# Patient Record
Sex: MALE | Race: WHITE | Employment: FULL TIME | URBAN - METROPOLITAN AREA
[De-identification: names, ages, dates, MRNs, and addresses within clinical notes are randomized per-mention and may not be internally consistent; named-entity substitution may affect disease eponyms.]

---

## 2020-06-09 ENCOUNTER — OFFICE VISIT (OUTPATIENT)
Dept: NEUROLOGY | Age: 45
End: 2020-06-09
Payer: COMMERCIAL

## 2020-06-09 VITALS
DIASTOLIC BLOOD PRESSURE: 96 MMHG | TEMPERATURE: 97.3 F | WEIGHT: 192 LBS | HEART RATE: 73 BPM | SYSTOLIC BLOOD PRESSURE: 143 MMHG | BODY MASS INDEX: 27.49 KG/M2 | HEIGHT: 70 IN

## 2020-06-09 PROCEDURE — G8427 DOCREV CUR MEDS BY ELIG CLIN: HCPCS | Performed by: PSYCHIATRY & NEUROLOGY

## 2020-06-09 PROCEDURE — 1036F TOBACCO NON-USER: CPT | Performed by: PSYCHIATRY & NEUROLOGY

## 2020-06-09 PROCEDURE — 99204 OFFICE O/P NEW MOD 45 MIN: CPT | Performed by: PSYCHIATRY & NEUROLOGY

## 2020-06-09 PROCEDURE — G8419 CALC BMI OUT NRM PARAM NOF/U: HCPCS | Performed by: PSYCHIATRY & NEUROLOGY

## 2020-06-09 RX ORDER — PREDNISONE 20 MG/1
TABLET ORAL
Qty: 18 TABLET | Refills: 0 | Status: SHIPPED | OUTPATIENT
Start: 2020-06-09 | End: 2020-08-13

## 2020-06-09 RX ORDER — AMLODIPINE BESYLATE 5 MG/1
5 TABLET ORAL DAILY
COMMUNITY

## 2020-06-09 NOTE — PROGRESS NOTES
REVIEW OF SYSTEMS    Constitutional Weight: absent, Appetite: absent, Fatigue: absent   HEENT Visual disturbance (saw a stream of light from his side vision): present, Ears: ringing   Respiratory Shortness of breath: absent, Cough: absent   Cardiovascular Chest pain: absent, Leg swelling :absent   GI Constipation: absent, Diarrhea: absent, Swallowing change: absent    Urinary frequency: absent, Urinary urgency: absent,    Musculoskeletal Neck pain: absent, Back pain: absent, Stiffness: absent, Muscle pain: absent, Joint pain: absent   Dermatological Hair loss: absent, Skin changes: absent   Neurological Memory loss: absent, Confusion: absent, Seizures: absent, Trouble walking or imbalance: absent, Dizziness: absent, Weakness: absent, Numbness: absent Tremor: absent, Spasm: absent, Speech difficulty: absent, Headache: present, Light sensitivity: absent   Psychiatric Anxiety: present, Hallucination: absent, Depression, present   Hematologic Abnormal bleeding/Bruising: absent, Anemia: absent
Jesenia Caban MD I would like to thank you for the consult. Please do not hesitate if you have any questions about the patient care. Scribe Attestation:   By signing my name below, I, Fito Sharif, attest that this documentation has been prepared under the direction and in the presence of Kwasi Herzog MD.     Electronically Signed: Fito Sharif. 6/9/20. 9:32 AM EDT. Physician Attestation:   Nolan Essex MD, personally performed the services described in this documentation. All medical record entries made by the scribe were at my direction and in my presence. I have reviewed the chart and discharge instructions (if applicable) and agree that the record reflects my personal performance and is accurate and complete.     Electronically Signed: Dangelo Hansen 6/10/2020 11:31 AM    Diplomate, American Board of Psychiatry and Neurology  Diplomate, American Board of Clinical Neurophysiology  Diplomate, American Board of Epilepsy

## 2020-06-11 ENCOUNTER — HOSPITAL ENCOUNTER (OUTPATIENT)
Age: 45
Setting detail: SPECIMEN
Discharge: HOME | End: 2020-06-11
Payer: COMMERCIAL

## 2020-06-11 LAB
ALT SERPL-CCNC: 11 U/L (ref 5–41)
ANION GAP SERPL CALCULATED.3IONS-SCNC: 17 MMOL/L (ref 9–17)
AST SERPL-CCNC: 13 U/L
BUN BLDV-MCNC: 14 MG/DL (ref 6–20)
BUN/CREAT BLD: NORMAL (ref 9–20)
CALCIUM SERPL-MCNC: 9.9 MG/DL (ref 8.6–10.4)
CHLORIDE BLD-SCNC: 102 MMOL/L (ref 98–107)
CHOLESTEROL/HDL RATIO: 3.7
CHOLESTEROL: 197 MG/DL
CO2: 23 MMOL/L (ref 20–31)
CREAT SERPL-MCNC: 0.99 MG/DL (ref 0.7–1.2)
GFR AFRICAN AMERICAN: >60 ML/MIN
GFR NON-AFRICAN AMERICAN: >60 ML/MIN
GFR SERPL CREATININE-BSD FRML MDRD: NORMAL ML/MIN/{1.73_M2}
GFR SERPL CREATININE-BSD FRML MDRD: NORMAL ML/MIN/{1.73_M2}
GLUCOSE BLD-MCNC: 81 MG/DL (ref 70–99)
HCT VFR BLD CALC: 48.8 % (ref 40.7–50.3)
HDLC SERPL-MCNC: 53 MG/DL
HEMOGLOBIN: 15.4 G/DL (ref 13–17)
LDL CHOLESTEROL: 123 MG/DL (ref 0–130)
MCH RBC QN AUTO: 29.1 PG (ref 25.2–33.5)
MCHC RBC AUTO-ENTMCNC: 31.6 G/DL (ref 28.4–34.8)
MCV RBC AUTO: 92.2 FL (ref 82.6–102.9)
NRBC AUTOMATED: 0 PER 100 WBC
PDW BLD-RTO: 13.3 % (ref 11.8–14.4)
PLATELET # BLD: 357 K/UL (ref 138–453)
PMV BLD AUTO: 10.7 FL (ref 8.1–13.5)
POTASSIUM SERPL-SCNC: 4.3 MMOL/L (ref 3.7–5.3)
RBC # BLD: 5.29 M/UL (ref 4.21–5.77)
SODIUM BLD-SCNC: 142 MMOL/L (ref 135–144)
TRIGL SERPL-MCNC: 104 MG/DL
VLDLC SERPL CALC-MCNC: NORMAL MG/DL (ref 1–30)
WBC # BLD: 11.1 K/UL (ref 3.5–11.3)

## 2020-06-19 ENCOUNTER — TELEPHONE (OUTPATIENT)
Dept: NEUROLOGY | Age: 45
End: 2020-06-19

## 2020-06-19 NOTE — TELEPHONE ENCOUNTER
Patient called the office this afternoon to give an update. He stated that he took his last prednisone dose yesterday morning. Patient doesn't feel that it helped his headaches. I did tell him that it can take a few days after finishing the prednisone. I told him that both Dr. Ivette Correa and his nurse Stanley Sweeney were out of the office until Monday. Patient will call back Monday to update how he is doing at that time.

## 2020-06-25 ENCOUNTER — HOSPITAL ENCOUNTER (OUTPATIENT)
Dept: MRI IMAGING | Facility: CLINIC | Age: 45
Discharge: HOME | End: 2020-06-27
Payer: COMMERCIAL

## 2020-06-25 PROCEDURE — A9579 GAD-BASE MR CONTRAST NOS,1ML: HCPCS | Performed by: PSYCHIATRY & NEUROLOGY

## 2020-06-25 PROCEDURE — 70553 MRI BRAIN STEM W/O & W/DYE: CPT

## 2020-06-25 PROCEDURE — 6360000004 HC RX CONTRAST MEDICATION: Performed by: PSYCHIATRY & NEUROLOGY

## 2020-06-25 RX ADMIN — GADOTERIDOL 17 ML: 279.3 INJECTION, SOLUTION INTRAVENOUS at 08:31

## 2020-07-02 NOTE — TELEPHONE ENCOUNTER
I called pt to see how his headaches were doing. He said that he is still getting them intermittently throughout the day, but not every day. He said that he woke up today with one 4/10. He said that it lasted about 30 mins and then went away. I told him that I will let you know about this and if you decided to send in any medication for him I would call and let him know. He stated his understanding.

## 2020-07-07 NOTE — TELEPHONE ENCOUNTER
I would suggest to start amitriptyline 25 mg p.o. nightly. Please caution the patient about the potential side effects including drowsiness. Please have him call back in about 2 weeks, sooner if any complaints.  Thanks

## 2020-07-13 RX ORDER — AMITRIPTYLINE HYDROCHLORIDE 25 MG/1
25 TABLET, FILM COATED ORAL NIGHTLY
Qty: 30 TABLET | Refills: 0 | Status: SHIPPED | OUTPATIENT
Start: 2020-07-13 | End: 2020-08-21

## 2020-08-13 ENCOUNTER — OFFICE VISIT (OUTPATIENT)
Dept: NEUROLOGY | Age: 45
End: 2020-08-13
Payer: COMMERCIAL

## 2020-08-13 VITALS
DIASTOLIC BLOOD PRESSURE: 86 MMHG | HEART RATE: 81 BPM | HEIGHT: 70 IN | BODY MASS INDEX: 27.92 KG/M2 | SYSTOLIC BLOOD PRESSURE: 137 MMHG | WEIGHT: 195 LBS

## 2020-08-13 PROCEDURE — G8419 CALC BMI OUT NRM PARAM NOF/U: HCPCS | Performed by: PSYCHIATRY & NEUROLOGY

## 2020-08-13 PROCEDURE — 1036F TOBACCO NON-USER: CPT | Performed by: PSYCHIATRY & NEUROLOGY

## 2020-08-13 PROCEDURE — 99214 OFFICE O/P EST MOD 30 MIN: CPT | Performed by: PSYCHIATRY & NEUROLOGY

## 2020-08-13 PROCEDURE — G8427 DOCREV CUR MEDS BY ELIG CLIN: HCPCS | Performed by: PSYCHIATRY & NEUROLOGY

## 2020-08-13 NOTE — PROGRESS NOTES
[facility], [address], [address] [place] [address]  Ph: [phone] or [phone]  FAX: [phone]    Chief Complaint: headaches     Dear No primary care provider on file. I had the pleasure of seeing your patient J Carlos Tariq in neurologic evaluation for headaches. As you would recall J Carlos Tariq is a 40 y.o. male. The history has been obtained from the patient and from the accompanying medical records. He reports that he has been having headaches since May 5th, 2020 but there is exacerbation of the symptoms over the past few weeks. The headaches are mild left frontal headache described as feeling similar to a caffeine withdrawal headache. The headaches are throbbing in nature. Patient states he was not controlling his hypertension at the time they began. He went to his primary care physician and received new medication to regulate his blood pressure. The left frontal headache has continued to wax and wane daily with about 5/10 on a pain scale. Pain is 5/10 about 3 days a week. In the last 30 days, patient reports no headache free days. Headaches are somewhat alleviated by physical activity and naproxen as needed. Prednisone taper did not provide relief. Amitriptyline reduces frequency and intensity. He has been on this for 4 weeks. Some fatigue, but otherwise no side effects. He is 60% better with amitriptyline. Denies difficulty with memory. No history of headaches. 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 Patient occasionally drinks alcohol. MRI brain 6/25/20 Unremarkable pre and post-contrast MRI of the brain.      REVIEW OF SYSTEMS   Constitutional Weight: absent, Appetite: absent, Fatigue: absent   HEENT Visual disturbance: absentEars: tinnitus left ear,    Reespiratory Shortness of breath: absent, Cough: absent   Cardivascular Chest pain: absent, Leg swelling :absent   GI Constipation: absent, Diarrhea: absent, Swallowing change: absent    Urinary frequency: absent, Urinary urgency: absent,    Musculoskeletal Neck pain: absent, Back pain: absent, Stiffness: absent, Muscle pain: absent, Joint pain: absent R   Dermatological Hair loss: absent, Skin changes: absent   Neurological Memory loss: absent, Confusion: absent, Seizures: absent, Trouble walking or imbalance: absent, Dizziness: absent, Weakness: absent, Numbness: absent Tremor: present, Spasm: absent, Speech difficulty: absent, Headache: present, Light sensitivity: absent   Psychiatric Anxiety: absent, Hallucination: absent, Mood disorder: Depression, absent   Hematologic Abnormal bleeding: absent, Anemia: absent,          Neurological work up:  CT head  CTA head and neck    MRI brain 20 Unremarkable pre and post-contrast MRI of the brain.   2 D echo     Past Medical History:   Diagnosis Date    GERD (gastroesophageal reflux disease)     Hypertension      Past Surgical History:   Procedure Laterality Date    UPPER GASTROINTESTINAL ENDOSCOPY  Aug 2010    Dr Brisa Herrera  (GERD)    WISDOM TOOTH EXTRACTION       No Known Allergies  Family History   Problem Relation Age of Onset    High Blood Pressure Father     High Cholesterol Father     Cancer Maternal Grandmother     Alzheimer's Disease Maternal Grandmother     Cancer Maternal Grandfather       Social History     Socioeconomic History    Marital status: Single     Spouse name: Not on file    Number of children: Not on file    Years of education: Not on file    Highest education level: Not on file   Occupational History    Not on file   Social Needs    Financial resource strain: Not on file    Food insecurity     Worry: Not on file     Inability: Not on file    Transportation needs     Medical: Not on file     Non-medical: Not on file   Tobacco Use    Smoking status: Former Smoker     Packs/day: 1.00     Years: 15.00     Pack years: 15.00     Last attempt to quit: 2008     Years since quittin.5    Smokeless tobacco: Former User Types: Lyndsay Stern     Quit date: 1/14/2008   Substance and Sexual Activity    Alcohol use: Yes     Comment: rarely     Drug use: Yes     Types: Marijuana     Comment: occasional    Sexual activity: Yes     Partners: Female     Comment: spouse   Lifestyle    Physical activity     Days per week: Not on file     Minutes per session: Not on file    Stress: Not on file   Relationships    Social connections     Talks on phone: Not on file     Gets together: Not on file     Attends Baptism service: Not on file     Active member of club or organization: Not on file     Attends meetings of clubs or organizations: Not on file     Relationship status: Not on file    Intimate partner violence     Fear of current or ex partner: Not on file     Emotionally abused: Not on file     Physically abused: Not on file     Forced sexual activity: Not on file   Other Topics Concern    Not on file   Social History Narrative    Not on file      /86   Pulse 81   Ht 5' 10\" (1.778 m)   Wt 195 lb (88.5 kg)   BMI 27.98 kg/m²      Physical examination:  General appearance: Normal. Well groomed.  In no acute distress    Head: Normocephalic, atraumatic  Eyes: Extraocular movements intact, eye lids normal  Lungs: Respirations unlabored, chest wall no deformity  ENT: Normal external ear canals, no sinus tenderness  Heart: Regular rate rhythm  Abdomen: No masses, tenderness  Extremities: No cyanosis or edema, 2+ pulses  Musculoskeletal: Normal range of motion in all joints  Skin: Intact, normal skin color    Neurological examination:    Mental status   Alert and oriented; intact memory with no confusion, speech or language problems; no hallucinations or delusions     Cranial nerves   II - visual fields intact to confrontation                                                III, IV, VI - extra-ocular muscles full: no pupillary defect; no ZHENG, no nystagmus, no ptosis   V - normal facial sensation VII - normal facial symmetry                                                             VIII - intact hearing                                                                             IX, X - symmetrical palate                                                                  XI - symmetrical shoulder shrug                                                       XII - midline tongue without atrophy or fasciculation     Motor function  Normal muscle bulk and tone; normal power 5/5, including fine motor movements. Sensory function Intact to touch, pin, vibration, proprioception     Cerebellar Intact fine motor movement. No involuntary movements or tremors     Reflex function Intact 2+ DTR and symmetric. Negative Babinski     Gait                  Normal station and gait       Lab Results   Component Value Date    LDLCHOLESTEROL 123 06/11/2020     No components found for: CHLPL  Lab Results   Component Value Date    TRIG 104 06/11/2020    TRIG 193 (H) 12/28/2011     Lab Results   Component Value Date    HDL 53 06/11/2020    HDL 49 12/28/2011     No results found for: LDLCALC  No results found for: LABVLDL  No results found for: LABA1C  No results found for: EAG  No results found for:  Alice     All of patient's labs were personally reviewed. All the imaging studies were personally reviewed and discussed with the patient. Assessment Recommendations:  Medically intractable headache    The patient has seen 60% improvement with amitriptyline. Recommend to continue amitriptyline 25 mg nightly. I would like the patient to keep a headache log to record the days he has a headache, as well as its severity and associated symptoms. May increase as the patient tolerates. MRI brain was unremarkable. Medication side effects were discussed and questions were answered. He will follow-up in 2-3 months or sooner if the patient's symptoms worsen or if there are any side effects.     No primary care provider on file. I would like to thank you for the consult. Please do not hesitate if you have any questions about the patient care. Scribe Attestation:   By signing my name below, I, Macho Zelaya, attest that this documentation has been prepared under the direction and in the presence of Raffy Chaidez MD.     Electronically Signed: Macho Zelaya. 8/13/20. 9:35 AM EDT. Physician Attestation:   Orly Tomlinson MD, personally performed the services described in this documentation. All medical record entries made by the scribe were at my direction and in my presence. I have reviewed the chart and discharge instructions (if applicable) and agree that the record reflects my personal performance and is accurate and complete.     Electronically Signed: Kelly Kinney 8/19/2020 11:53 PM    Diplomate, American Board of Psychiatry and Neurology  Diplomate, American Board of Clinical Neurophysiology  Diplomate, American Board of Epilepsy

## 2020-08-13 NOTE — PROGRESS NOTES
REVIEW OF SYSTEMS    Constitutional Weight: absent, Appetite: absent, Fatigue: absent   HEENT Visual disturbance: absentEars: tinnitus left ear,    Reespiratory Shortness of breath: absent, Cough: absent   Cardivascular Chest pain: absent, Leg swelling :absent   GI Constipation: absent, Diarrhea: absent, Swallowing change: absent    Urinary frequency: absent, Urinary urgency: absent,    Musculoskeletal Neck pain: absent, Back pain: absent, Stiffness: absent, Muscle pain: absent, Joint pain: absent R   Dermatological Hair loss: absent, Skin changes: absent   Neurological Memory loss: absent, Confusion: absent, Seizures: absent, Trouble walking or imbalance: absent, Dizziness: absent, Weakness: absent, Numbness: absent Tremor: present, Spasm: absent, Speech difficulty: absent, Headache: present, Light sensitivity: absent   Psychiatric Anxiety: absent, Hallucination: absent, Mood disorder: Depression, absent   Hematologic Abnormal bleeding: absent, Anemia: absent,

## 2020-08-21 RX ORDER — AMITRIPTYLINE HYDROCHLORIDE 25 MG/1
25 TABLET, FILM COATED ORAL NIGHTLY
Qty: 30 TABLET | Refills: 5 | Status: SHIPPED | OUTPATIENT
Start: 2020-08-21 | End: 2020-10-14

## 2020-08-21 NOTE — TELEPHONE ENCOUNTER
Pharmacy requesting refill of Elavil 25 mg.      Medication active on med list yes      Date last ordered: 7/13/2020  verified on 8/21/2020   verified by JENY Dumont LPN      Date of last appointment 8/12/2020    Next Visit Date:  10/14/2020

## 2020-10-14 ENCOUNTER — TELEMEDICINE (OUTPATIENT)
Dept: NEUROLOGY | Age: 45
End: 2020-10-14
Payer: COMMERCIAL

## 2020-10-14 PROCEDURE — 99214 OFFICE O/P EST MOD 30 MIN: CPT | Performed by: PSYCHIATRY & NEUROLOGY

## 2020-10-14 PROCEDURE — 1036F TOBACCO NON-USER: CPT | Performed by: PSYCHIATRY & NEUROLOGY

## 2020-10-14 PROCEDURE — G8419 CALC BMI OUT NRM PARAM NOF/U: HCPCS | Performed by: PSYCHIATRY & NEUROLOGY

## 2020-10-14 PROCEDURE — G8484 FLU IMMUNIZE NO ADMIN: HCPCS | Performed by: PSYCHIATRY & NEUROLOGY

## 2020-10-14 PROCEDURE — G8427 DOCREV CUR MEDS BY ELIG CLIN: HCPCS | Performed by: PSYCHIATRY & NEUROLOGY

## 2020-10-14 RX ORDER — AMITRIPTYLINE HYDROCHLORIDE 50 MG/1
50 TABLET, FILM COATED ORAL NIGHTLY
Qty: 30 TABLET | Refills: 3 | Status: SHIPPED | OUTPATIENT
Start: 2020-10-14 | End: 2020-11-13

## 2020-10-14 NOTE — PROGRESS NOTES
[address]  [facility], [address] [place] [address], [address] [facility]  Ph: [phone] or [phone]  FAX: [phone]    This is a telehealth visit    Chief Complaint: headaches     Dear No primary care provider on file. I had the pleasure of seeing your patient Julia Ramsey in neurologic evaluation for headaches. As you would recall Julia Ramsey is a 40 y.o. male. The history has been obtained from the patient and from the accompanying medical records. The headaches are mild left frontal headache described as feeling similar to a caffeine withdrawal headache. The headaches are throbbing in nature. Patient states he was not controlling his hypertension at the time they began. He went to his primary care physician and received new medication to regulate his blood pressure. The left frontal headache has continued to wax and wane daily with about 5/10 on a pain scale. Pain is 5/10 about 3 days a week. Headaches are somewhat alleviated by physical activity and naproxen as needed. Prednisone taper did not provide relief. Amitriptyline reduces frequency and intensity. He has been on this for 4 weeks. Some fatigue, but otherwise no side effects. He is 60% better with amitriptyline. Denies difficulty with memory. Today, the patient reports taking amitriptyline 25 mg every night. He notes left frontal flare ups in the morning. He notes heavy stress is likely a trigger for him. No history of headaches. 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 Patient occasionally drinks alcohol. MRI brain 6/25/20 Unremarkable pre and post-contrast MRI of the brain.      REVIEW OF SYSTEMS   Constitutional Weight: absent, Appetite: absent, Fatigue: absent   HEENT Visual disturbance: absentEars: tinnitus left ear,    Reespiratory Shortness of breath: absent, Cough: absent   Cardivascular Chest pain: absent, Leg swelling :absent   GI Constipation: absent, Diarrhea: absent, Swallowing change: absent    Urinary frequency: absent, Urinary urgency: absent,    Musculoskeletal Neck pain: absent, Back pain: absent, Stiffness: absent, Muscle pain: absent, Joint pain: absent R   Dermatological Hair loss: absent, Skin changes: absent   Neurological Memory loss: absent, Confusion: absent, Seizures: absent, Trouble walking or imbalance: absent, Dizziness: absent, Weakness: absent, Numbness: absent Tremor: present, Spasm: absent, Speech difficulty: absent, Headache: present, Light sensitivity: absent   Psychiatric Anxiety: absent, Hallucination: absent, Mood disorder: Depression, absent   Hematologic Abnormal bleeding: absent, Anemia: absent,          Neurological work up:  CT head  CTA head and neck    MRI brain 20 Unremarkable pre and post-contrast MRI of the brain.   2 D echo     Past Medical History:   Diagnosis Date    GERD (gastroesophageal reflux disease)     Hypertension      Past Surgical History:   Procedure Laterality Date    UPPER GASTROINTESTINAL ENDOSCOPY  Aug 2010    Dr Taj Pinedo  (GERD)    WISDOM TOOTH EXTRACTION       No Known Allergies  Family History   Problem Relation Age of Onset    High Blood Pressure Father     High Cholesterol Father     Cancer Maternal Grandmother     Alzheimer's Disease Maternal Grandmother     Cancer Maternal Grandfather       Social History     Socioeconomic History    Marital status:      Spouse name: Not on file    Number of children: Not on file    Years of education: Not on file    Highest education level: Not on file   Occupational History    Not on file   Social Needs    Financial resource strain: Not on file    Food insecurity     Worry: Not on file     Inability: Not on file    Transportation needs     Medical: Not on file     Non-medical: Not on file   Tobacco Use    Smoking status: Former Smoker     Packs/day: 1.00     Years: 15.00     Pack years: 15.00     Last attempt to quit: 2008     Years since quittin.7    Smokeless tobacco: Former User     Types: Chew     Quit date: 1/14/2008   Substance and Sexual Activity    Alcohol use: Yes     Comment: rarely     Drug use: Yes     Types: Marijuana     Comment: occasional    Sexual activity: Yes     Partners: Female     Comment: spouse   Lifestyle    Physical activity     Days per week: Not on file     Minutes per session: Not on file    Stress: Not on file   Relationships    Social connections     Talks on phone: Not on file     Gets together: Not on file     Attends Orthodoxy service: Not on file     Active member of club or organization: Not on file     Attends meetings of clubs or organizations: Not on file     Relationship status: Not on file    Intimate partner violence     Fear of current or ex partner: Not on file     Emotionally abused: Not on file     Physically abused: Not on file     Forced sexual activity: Not on file   Other Topics Concern    Not on file   Social History Narrative    Not on file      There were no vitals taken for this visit. Physical examination:  General appearance: Normal. Well groomed.  In no acute distress    Head: Normocephalic, atraumatic  Eyes: Extraocular movements intact, eye lids normal  Lungs: Respirations unlabored, chest wall no deformity  ENT: Normal external ear canals, no sinus tenderness  Heart: Regular rate rhythm  Abdomen: No masses, tenderness  Extremities: No cyanosis or edema, 2+ pulses  Musculoskeletal: Normal range of motion in all joints  Skin: Intact, normal skin color    Neurological examination:    Mental status   Alert and oriented; intact memory with no confusion, speech or language problems; no hallucinations or delusions     Cranial nerves   II - visual fields intact to confrontation                                                III, IV, VI - extra-ocular muscles full: no pupillary defect; no ZHENG, no nystagmus, no ptosis   V - normal facial sensation                                                               VII - normal facial symmetry                                                             VIII - intact hearing                                                                             IX, X - symmetrical palate                                                                  XI - symmetrical shoulder shrug                                                       XII - midline tongue without atrophy or fasciculation     Motor function  Normal muscle bulk and tone; normal power 5/5, including fine motor movements. Sensory function Intact to touch, pin, vibration, proprioception     Cerebellar Intact fine motor movement. No involuntary movements or tremors     Reflex function Intact 2+ DTR and symmetric. Negative Babinski     Gait                  Normal station and gait       Lab Results   Component Value Date    LDLCHOLESTEROL 123 06/11/2020     No components found for: CHLPL  Lab Results   Component Value Date    TRIG 104 06/11/2020    TRIG 193 (H) 12/28/2011     Lab Results   Component Value Date    HDL 53 06/11/2020    HDL 49 12/28/2011     No results found for: LDLCALC  No results found for: LABVLDL  No results found for: LABA1C  No results found for: EAG  No results found for: Tex Ort     All of patient's labs were personally reviewed. All the imaging studies were personally reviewed and discussed with the patient. Assessment Recommendations:  Medically intractable headache    The patient continues to experience flare ups, possibly due to stress. I would increase amitriptyline to 50 mg nightly. Medication compliance was discussed and patient questions were answered. Side effects were also discussed. I advised the patient to call me with any side effects. I would like the patient to keep a headache log to record the days he has a headache, as well as its severity and associated symptoms. He will follow-up in 2-3 months or sooner if the patient's symptoms worsen or if there are any side effects.     No primary care provider on file. I would like to thank you for the consult. Please do not hesitate if you have any questions about the patient care. Scribe Attestation:   By signing my name below, Floresita Norman, attest that this documentation has been prepared under the direction and in the presence of Azam Holman MD.     Electronically Signed: Kade Wolf.      Physician Attestation:   Shelby Babb MD, personally performed the services described in this documentation. All medical record entries made by the scribe were at my direction and in my presence. I have reviewed the chart and discharge instructions (if applicable) and agree that the record reflects my personal performance and is accurate and complete. Electronically Signed: Carroll Hathaway 10/14/2020 9:07 AM    Diplomate, American Board of Psychiatry and Neurology  Diplomate, American Board of Clinical Neurophysiology  Diplomate, American Board of Epilepsy           This is a telehealth visit that was performed with the originating site at Patient Location: Patient Home and Provider Location of [place], [place]. Verbal consent to participate in video visit was obtained. Pursuant to the emergency declaration under the [facility]5 waiver authority and the [employer] General Act, this Virtual Visit was conducted, with patient's consent, to reduce the patient's risk of exposure to COVID-19 and provide continuity of care for an established/new patient. Services were provided through a video synchronous discussion virtually to substitute for in-person clinic visit.  I discussed with the patient the nature of our telehealth visits via interactive/real-time audio/video that:  - I would evaluate the patient and recommend diagnostics and treatments based on my assessment  - Our sessions are not being recorded and that personal health information is protected  - Our team would provide follow up care in person if/when the patient needs it.

## 2025-01-30 ENCOUNTER — HOSPITAL ENCOUNTER (OUTPATIENT)
Age: 50
Setting detail: SPECIMEN
Discharge: HOME | End: 2025-01-30

## 2025-01-30 LAB
25(OH)D3 SERPL-MCNC: 23.1 NG/ML (ref 30–100)
ALT SERPL-CCNC: 21 U/L (ref 10–50)
ANION GAP SERPL CALCULATED.3IONS-SCNC: 11 MMOL/L (ref 9–16)
BASOPHILS # BLD: 0.06 K/UL (ref 0–0.2)
BASOPHILS NFR BLD: 1 % (ref 0–2)
BUN SERPL-MCNC: 18 MG/DL (ref 6–20)
CALCIUM SERPL-MCNC: 9.8 MG/DL (ref 8.6–10.4)
CHLORIDE SERPL-SCNC: 106 MMOL/L (ref 98–107)
CHOLEST SERPL-MCNC: 201 MG/DL (ref 0–199)
CHOLESTEROL/HDL RATIO: 4.2
CO2 SERPL-SCNC: 24 MMOL/L (ref 20–31)
CREAT SERPL-MCNC: 1 MG/DL (ref 0.7–1.2)
EOSINOPHIL # BLD: 0.37 K/UL (ref 0–0.44)
EOSINOPHILS RELATIVE PERCENT: 4 % (ref 1–4)
ERYTHROCYTE [DISTWIDTH] IN BLOOD BY AUTOMATED COUNT: 13.4 % (ref 11.8–14.4)
EST. AVERAGE GLUCOSE BLD GHB EST-MCNC: 103 MG/DL
GFR, ESTIMATED: >90 ML/MIN/1.73M2
GLUCOSE SERPL-MCNC: 94 MG/DL (ref 74–99)
HBA1C MFR BLD: 5.2 % (ref 4–6)
HCT VFR BLD AUTO: 46.3 % (ref 40.7–50.3)
HDLC SERPL-MCNC: 48 MG/DL
HGB BLD-MCNC: 15.4 G/DL (ref 13–17)
IMM GRANULOCYTES # BLD AUTO: 0.03 K/UL (ref 0–0.3)
IMM GRANULOCYTES NFR BLD: 0 %
LDLC SERPL CALC-MCNC: 128 MG/DL (ref 0–100)
LYMPHOCYTES NFR BLD: 2.88 K/UL (ref 1.1–3.7)
LYMPHOCYTES RELATIVE PERCENT: 31 % (ref 24–43)
MAGNESIUM SERPL-MCNC: 2 MG/DL (ref 1.6–2.6)
MCH RBC QN AUTO: 28.2 PG (ref 25.2–33.5)
MCHC RBC AUTO-ENTMCNC: 33.3 G/DL (ref 28.4–34.8)
MCV RBC AUTO: 84.6 FL (ref 82.6–102.9)
MONOCYTES NFR BLD: 0.62 K/UL (ref 0.1–1.2)
MONOCYTES NFR BLD: 7 % (ref 3–12)
NEUTROPHILS NFR BLD: 57 % (ref 36–65)
NEUTS SEG NFR BLD: 5.48 K/UL (ref 1.5–8.1)
NRBC BLD-RTO: 0 PER 100 WBC
PLATELET # BLD AUTO: 302 K/UL (ref 138–453)
PMV BLD AUTO: 10.8 FL (ref 8.1–13.5)
POTASSIUM SERPL-SCNC: 4.4 MMOL/L (ref 3.7–5.3)
RBC # BLD AUTO: 5.47 M/UL (ref 4.21–5.77)
SODIUM SERPL-SCNC: 141 MMOL/L (ref 136–145)
T4 SERPL-MCNC: 8.2 UG/DL (ref 4.5–11.7)
TRIGL SERPL-MCNC: 125 MG/DL (ref 0–149)
TSH SERPL DL<=0.05 MIU/L-ACNC: 2.35 UIU/ML (ref 0.27–4.2)
VLDLC SERPL CALC-MCNC: 25 MG/DL (ref 1–30)
WBC OTHER # BLD: 9.4 K/UL (ref 3.5–11.3)

## 2025-03-31 ENCOUNTER — HOSPITAL ENCOUNTER (OUTPATIENT)
Age: 50
Setting detail: SPECIMEN
Discharge: HOME | End: 2025-03-31

## 2025-03-31 LAB
ANION GAP SERPL CALCULATED.3IONS-SCNC: 14 MMOL/L (ref 9–16)
BUN SERPL-MCNC: 17 MG/DL (ref 6–20)
CALCIUM SERPL-MCNC: 10.2 MG/DL (ref 8.6–10.4)
CHLORIDE SERPL-SCNC: 102 MMOL/L (ref 98–107)
CO2 SERPL-SCNC: 22 MMOL/L (ref 20–31)
CREAT SERPL-MCNC: 1 MG/DL (ref 0.7–1.2)
GFR, ESTIMATED: >90 ML/MIN/1.73M2
GLUCOSE SERPL-MCNC: 94 MG/DL (ref 74–99)
POTASSIUM SERPL-SCNC: 4.4 MMOL/L (ref 3.7–5.3)
SODIUM SERPL-SCNC: 138 MMOL/L (ref 136–145)